# Patient Record
Sex: FEMALE | Race: BLACK OR AFRICAN AMERICAN | NOT HISPANIC OR LATINO | Employment: FULL TIME | URBAN - METROPOLITAN AREA
[De-identification: names, ages, dates, MRNs, and addresses within clinical notes are randomized per-mention and may not be internally consistent; named-entity substitution may affect disease eponyms.]

---

## 2024-06-25 ENCOUNTER — HOSPITAL ENCOUNTER (EMERGENCY)
Facility: HOSPITAL | Age: 44
Discharge: HOME | End: 2024-06-25
Attending: INTERNAL MEDICINE
Payer: MEDICAID

## 2024-06-25 VITALS
WEIGHT: 142.88 LBS | DIASTOLIC BLOOD PRESSURE: 95 MMHG | TEMPERATURE: 98 F | HEIGHT: 68 IN | BODY MASS INDEX: 21.65 KG/M2 | RESPIRATION RATE: 18 BRPM | HEART RATE: 97 BPM | OXYGEN SATURATION: 99 % | SYSTOLIC BLOOD PRESSURE: 153 MMHG

## 2024-06-25 DIAGNOSIS — N93.8 DUB (DYSFUNCTIONAL UTERINE BLEEDING): Primary | ICD-10-CM

## 2024-06-25 LAB
B-HCG UR QL: NEGATIVE
BASOPHILS # BLD AUTO: 0.05 X10(3)/MCL
BASOPHILS NFR BLD AUTO: 0.7 %
CTP QC/QA: YES
EOSINOPHIL # BLD AUTO: 0.14 X10(3)/MCL (ref 0–0.9)
EOSINOPHIL NFR BLD AUTO: 2 %
ERYTHROCYTE [DISTWIDTH] IN BLOOD BY AUTOMATED COUNT: 17.6 % (ref 11.5–17)
HCT VFR BLD AUTO: 30.1 % (ref 37–47)
HGB BLD-MCNC: 10.5 G/DL (ref 12–16)
IMM GRANULOCYTES # BLD AUTO: 0.01 X10(3)/MCL (ref 0–0.04)
IMM GRANULOCYTES NFR BLD AUTO: 0.1 %
LYMPHOCYTES # BLD AUTO: 3.03 X10(3)/MCL (ref 0.6–4.6)
LYMPHOCYTES NFR BLD AUTO: 43.1 %
MCH RBC QN AUTO: 31.3 PG (ref 27–31)
MCHC RBC AUTO-ENTMCNC: 34.9 G/DL (ref 33–36)
MCV RBC AUTO: 89.9 FL (ref 80–94)
MONOCYTES # BLD AUTO: 0.49 X10(3)/MCL (ref 0.1–1.3)
MONOCYTES NFR BLD AUTO: 7 %
NEUTROPHILS # BLD AUTO: 3.31 X10(3)/MCL (ref 2.1–9.2)
NEUTROPHILS NFR BLD AUTO: 47.1 %
NRBC BLD AUTO-RTO: 0 %
PLATELET # BLD AUTO: 363 X10(3)/MCL (ref 130–400)
PMV BLD AUTO: 9.5 FL (ref 7.4–10.4)
RBC # BLD AUTO: 3.35 X10(6)/MCL (ref 4.2–5.4)
WBC # BLD AUTO: 7.03 X10(3)/MCL (ref 4.5–11.5)

## 2024-06-25 PROCEDURE — 85025 COMPLETE CBC W/AUTO DIFF WBC: CPT

## 2024-06-25 PROCEDURE — 96372 THER/PROPH/DIAG INJ SC/IM: CPT

## 2024-06-25 PROCEDURE — 63600175 PHARM REV CODE 636 W HCPCS

## 2024-06-25 PROCEDURE — 99284 EMERGENCY DEPT VISIT MOD MDM: CPT | Mod: 25

## 2024-06-25 PROCEDURE — 81025 URINE PREGNANCY TEST: CPT | Performed by: INTERNAL MEDICINE

## 2024-06-25 RX ORDER — KETOROLAC TROMETHAMINE 30 MG/ML
30 INJECTION, SOLUTION INTRAMUSCULAR; INTRAVENOUS
Status: COMPLETED | OUTPATIENT
Start: 2024-06-25 | End: 2024-06-25

## 2024-06-25 RX ORDER — KETOROLAC TROMETHAMINE 30 MG/ML
INJECTION, SOLUTION INTRAMUSCULAR; INTRAVENOUS
Status: DISCONTINUED
Start: 2024-06-25 | End: 2024-06-26 | Stop reason: HOSPADM

## 2024-06-25 RX ORDER — NAPROXEN 500 MG/1
500 TABLET ORAL 2 TIMES DAILY
Qty: 10 TABLET | Refills: 0 | Status: SHIPPED | OUTPATIENT
Start: 2024-06-25

## 2024-06-25 RX ADMIN — KETOROLAC TROMETHAMINE 30 MG: 30 INJECTION, SOLUTION INTRAMUSCULAR; INTRAVENOUS at 10:06

## 2024-06-26 NOTE — ED PROVIDER NOTES
"Encounter Date: 2024       History     Chief Complaint   Patient presents with    Vaginal Bleeding     Vaginal bleeding x 5 weeks, 3 pads per day. Reports seen by GYN and received depot shot. C/o lower abdominal pain.     43 y.o f present with vaginal bleeding and right lower abdominal pain. In the beginning require 4-5 heavy duty pads per day, changing every 2 hours. Abdominal pain is 8/10, travels along the lower abdomen. Hx of . Gyn is [facility] in [place]. Work up so far includes transvaginal and transabdominal US, results have all been negative. Underwent endometrial "scraping" 3 weeks prior along with depo provera injection.    The history is provided by the patient.     Review of patient's allergies indicates:  No Known Allergies  History reviewed. No pertinent past medical history.  History reviewed. No pertinent surgical history.  No family history on file.  Social History     Tobacco Use    Smoking status: Every Day     Current packs/day: 1.00     Types: Cigarettes    Smokeless tobacco: Never     Review of Systems   Constitutional:  Negative for chills, diaphoresis and fever.   Respiratory:  Negative for shortness of breath.    Cardiovascular:  Negative for chest pain.   Gastrointestinal:  Positive for abdominal pain. Negative for nausea and vomiting.   Genitourinary:  Positive for vaginal bleeding. Negative for dysuria and vaginal discharge.   Neurological:  Negative for dizziness.       Physical Exam     Initial Vitals [24]   BP Pulse Resp Temp SpO2   (!) 142/99 110 14 98.2 °F (36.8 °C) 100 %      MAP       --         Physical Exam    Nursing note and vitals reviewed.  Constitutional: She appears well-developed. No distress.   HENT:   Head: Normocephalic and atraumatic.   Right Ear: External ear normal.   Left Ear: External ear normal.   Nose: Nose normal.   Mouth/Throat: Oropharynx is clear and moist.   Eyes: EOM are normal. Pupils are equal, round, and reactive to light.   Neck: " Neck supple. No thyromegaly present. No JVD present.   Normal range of motion.  Cardiovascular:  Normal rate, regular rhythm, normal heart sounds and intact distal pulses.           No murmur heard.  Pulmonary/Chest: Breath sounds normal. No respiratory distress. She has no wheezes. She has no rales.   Abdominal: Abdomen is soft. She exhibits no distension and no mass. There is abdominal tenderness (right lower quadrant). There is guarding. There is no rebound.   Genitourinary:    Pelvic exam was performed with patient prone.   There is no lesion on the right labia. There is no lesion on the left labia. Uterus is not enlarged. Cervix exhibits no motion tenderness and no discharge.    Vaginal bleeding present.   There is bleeding in the vagina.    No signs of injury in the vagina.      Genitourinary Comments: Able to visualize cervix, oozing blood     Musculoskeletal:         General: No tenderness.      Cervical back: Normal range of motion and neck supple.     Neurological: She is alert and oriented to person, place, and time. She has normal strength. GCS score is 15. GCS eye subscore is 4. GCS verbal subscore is 5. GCS motor subscore is 6.   Skin: Skin is warm and dry. Capillary refill takes less than 2 seconds. No rash noted.   Old healed  scar to right lower abdomen         ED Course   Procedures  Labs Reviewed   CBC WITH DIFFERENTIAL - Abnormal; Notable for the following components:       Result Value    RBC 3.35 (*)     Hgb 10.5 (*)     Hct 30.1 (*)     MCH 31.3 (*)     RDW 17.6 (*)     All other components within normal limits   CBC W/ AUTO DIFFERENTIAL    Narrative:     The following orders were created for panel order CBC auto differential.  Procedure                               Abnormality         Status                     ---------                               -----------         ------                     CBC with Differential[[id number]]       Abnormal            Final result                  Please view results for these tests on the individual orders.   EXTRA TUBES    Narrative:     The following orders were created for panel order EXTRA TUBES.  Procedure                               Abnormality         Status                     ---------                               -----------         ------                     Light Green Top Hold[[id number]]                                                       Gold Top Hold[[id number]]                                                                Please view results for these tests on the individual orders.   LIGHT GREEN TOP HOLD   GOLD TOP HOLD   POCT URINE PREGNANCY          Imaging Results    None          Medications   ketorolac (TORADOL) 30 mg/mL (1 mL) injection (has no administration in time range)   ketorolac injection 30 mg (30 mg Intramuscular Given 24)     Medical Decision Making  43 y.o f present with vaginal bleeding and right lower abdominal pain. Hemodynamically stable, on exam she did not have copious amount of blood obscuring cervix. Given toradol in ED for pain and discharged home with naproxen x 5 days. She expressed understanding and was given strict ED return precautions for fatigue, shortness of breath, heart palpitations, and dizziness.    Amount and/or Complexity of Data Reviewed  Labs: ordered. Decision-making details documented in ED Course.    Risk  Prescription drug management.      Additional MDM:   Differential Diagnosis:   Other: The following diagnoses were also considered and will be evaluated: spontateous , pregnancy and severe anemia.           Attending Attestation:   Physician Attestation Statement for Resident:  As the supervising MD   Physician Attestation Statement: I have personally seen and examined this patient.   I agree with the above history.  -:   As the supervising MD I agree with the above PE.     As the supervising MD I agree with the above treatment, course, plan, and disposition.    I  have reviewed and agree with the residents interpretation of the following: lab data.                 ED Course as of 06/25/24 2229 Tue Jun 25, 2024 2224 Discussed with patient the results and treatment plan. She is hemodynamically stable. Will need to follow up with PCP and Gyn to explore treatment options. Declined referral to [facility] gyn at this time. Toradol injection given in ED and rx for naproen sent to patient pharmacy. She is stable for discharge. [AS]      ED Course User Index  [AS] Velvet De La Rosa DO                           Clinical Impression:  Final diagnoses:  [N93.8] DUB (dysfunctional uterine bleeding) (Primary)          ED Disposition Condition    Discharge Stable          ED Prescriptions       Medication Sig Dispense Start Date End Date Auth. Provider    naproxen (NAPROSYN) 500 MG tablet Take 1 tablet (500 mg total) by mouth 2 (two) times daily. 10 tablet 6/25/2024 -- Velvet De La Rosa DO          Follow-up Information       Follow up With Specialties Details Why Contact Info    Malcolm Hastings MD Family Medicine Schedule an appointment as soon as possible for a visit   [address] [place] [address]  [phone]      Yannick, Asia GLYNN MD Obstetrics and Gynecology Schedule an appointment as soon as possible for a visit   [address] [facility] [address]  [phone]      [facility] Emergency Medicine  If symptoms worsen [address] [facility] [address] [phone]             Velvet De La Rosa DO  Resident  06/25/24 2229       Foster Rosenthal MD  06/25/24 5386

## 2024-08-02 ENCOUNTER — HOSPITAL ENCOUNTER (EMERGENCY)
Facility: HOSPITAL | Age: 44
Discharge: HOME | End: 2024-08-02
Attending: EMERGENCY MEDICINE
Payer: MEDICAID

## 2024-08-02 VITALS
SYSTOLIC BLOOD PRESSURE: 155 MMHG | TEMPERATURE: 97 F | WEIGHT: 143.31 LBS | RESPIRATION RATE: 15 BRPM | DIASTOLIC BLOOD PRESSURE: 79 MMHG | HEART RATE: 85 BPM | BODY MASS INDEX: 21.79 KG/M2 | OXYGEN SATURATION: 100 %

## 2024-08-02 DIAGNOSIS — K43.9 ABDOMINAL WALL HERNIA: Primary | ICD-10-CM

## 2024-08-02 DIAGNOSIS — R52 PAIN: ICD-10-CM

## 2024-08-02 DIAGNOSIS — R10.31 RIGHT LOWER QUADRANT ABDOMINAL PAIN: ICD-10-CM

## 2024-08-02 DIAGNOSIS — G89.29 CHRONIC RIGHT SHOULDER PAIN: ICD-10-CM

## 2024-08-02 DIAGNOSIS — M25.511 CHRONIC RIGHT SHOULDER PAIN: ICD-10-CM

## 2024-08-02 LAB
BACTERIA #/AREA URNS AUTO: ABNORMAL /HPF
BASOPHILS # BLD AUTO: 0.07 X10(3)/MCL
BASOPHILS NFR BLD AUTO: 1.3 %
BILIRUB UR QL STRIP.AUTO: NEGATIVE
CLARITY UR: ABNORMAL
COLOR UR AUTO: ABNORMAL
EOSINOPHIL # BLD AUTO: 0 X10(3)/MCL (ref 0–0.9)
EOSINOPHIL NFR BLD AUTO: 0 %
ERYTHROCYTE [DISTWIDTH] IN BLOOD BY AUTOMATED COUNT: 15.9 % (ref 11.5–17)
GLUCOSE UR QL STRIP: NORMAL
HCT VFR BLD AUTO: 30.5 % (ref 37–47)
HGB BLD-MCNC: 10.3 G/DL (ref 12–16)
HGB UR QL STRIP: NEGATIVE
HOLD SPECIMEN: NORMAL
HYALINE CASTS #/AREA URNS LPF: ABNORMAL /LPF
IMM GRANULOCYTES # BLD AUTO: 0.01 X10(3)/MCL (ref 0–0.04)
IMM GRANULOCYTES NFR BLD AUTO: 0.2 %
KETONES UR QL STRIP: NEGATIVE
LEUKOCYTE ESTERASE UR QL STRIP: 25
LYMPHOCYTES # BLD AUTO: 2.78 X10(3)/MCL (ref 0.6–4.6)
LYMPHOCYTES NFR BLD AUTO: 51.2 %
MCH RBC QN AUTO: 30.1 PG (ref 27–31)
MCHC RBC AUTO-ENTMCNC: 33.8 G/DL (ref 33–36)
MCV RBC AUTO: 89.2 FL (ref 80–94)
MONOCYTES # BLD AUTO: 0.31 X10(3)/MCL (ref 0.1–1.3)
MONOCYTES NFR BLD AUTO: 5.7 %
MUCOUS THREADS URNS QL MICRO: ABNORMAL /LPF
NEUTROPHILS # BLD AUTO: 2.26 X10(3)/MCL (ref 2.1–9.2)
NEUTROPHILS NFR BLD AUTO: 41.6 %
NITRITE UR QL STRIP: NEGATIVE
NRBC BLD AUTO-RTO: 0 %
PH UR STRIP: 6 [PH]
PLATELET # BLD AUTO: 333 X10(3)/MCL (ref 130–400)
PMV BLD AUTO: 9.5 FL (ref 7.4–10.4)
PROT UR QL STRIP: ABNORMAL
RBC # BLD AUTO: 3.42 X10(6)/MCL (ref 4.2–5.4)
RBC #/AREA URNS AUTO: ABNORMAL /HPF
SARS-COV-2 RDRP RESP QL NAA+PROBE: NEGATIVE
SP GR UR STRIP.AUTO: 1.01 (ref 1–1.03)
SQUAMOUS #/AREA URNS LPF: ABNORMAL /HPF
UROBILINOGEN UR STRIP-ACNC: NORMAL
WBC # BLD AUTO: 5.43 X10(3)/MCL (ref 4.5–11.5)
WBC #/AREA URNS AUTO: ABNORMAL /HPF
WBC CLUMPS UR QL AUTO: ABNORMAL

## 2024-08-02 PROCEDURE — 85025 COMPLETE CBC W/AUTO DIFF WBC: CPT

## 2024-08-02 PROCEDURE — 81001 URINALYSIS AUTO W/SCOPE: CPT

## 2024-08-02 PROCEDURE — U0002 COVID-19 LAB TEST NON-CDC: HCPCS

## 2024-08-02 PROCEDURE — 99284 EMERGENCY DEPT VISIT MOD MDM: CPT | Mod: 25

## 2024-08-02 RX ORDER — IBUPROFEN 800 MG/1
800 TABLET ORAL EVERY 8 HOURS PRN
COMMUNITY
Start: 2024-07-29

## 2024-08-02 RX ORDER — MEDROXYPROGESTERONE ACETATE 150 MG/ML
150 INJECTION, SUSPENSION INTRAMUSCULAR
COMMUNITY
Start: 2024-06-05

## 2024-08-02 RX ORDER — FOLIC ACID 1 MG/1
1000 TABLET ORAL DAILY
COMMUNITY
Start: 2024-07-18

## 2024-08-13 ENCOUNTER — OFFICE VISIT (OUTPATIENT)
Dept: SURGERY | Facility: CLINIC | Age: 44
End: 2024-08-13
Payer: MEDICAID

## 2024-08-13 VITALS
HEIGHT: 68 IN | TEMPERATURE: 98 F | RESPIRATION RATE: 20 BRPM | SYSTOLIC BLOOD PRESSURE: 150 MMHG | WEIGHT: 148 LBS | HEART RATE: 96 BPM | BODY MASS INDEX: 22.43 KG/M2 | OXYGEN SATURATION: 100 % | DIASTOLIC BLOOD PRESSURE: 102 MMHG

## 2024-08-13 DIAGNOSIS — K43.9 ABDOMINAL WALL HERNIA: ICD-10-CM

## 2024-08-13 PROCEDURE — 99215 OFFICE O/P EST HI 40 MIN: CPT | Mod: PBBFAC

## 2024-08-13 RX ORDER — TRAMADOL HYDROCHLORIDE 50 MG/1
50 TABLET ORAL EVERY 12 HOURS PRN
Qty: 14 TABLET | Refills: 0 | Status: SHIPPED | OUTPATIENT
Start: 2024-08-13 | End: 2024-08-20

## 2024-08-13 NOTE — PROGRESS NOTES
[facility]   Clinic Note       CC: abdominal hernia       HPI: Rosa Treviño is a 43 y.o. female with PMHx HTN and PSHx of 3 C sections, most recent 18 years ago, presents for evaluation of right lower abdominal pain. She has had this hard mass right lower abdomen since her last C section, but it did not cause her any problems until 2 years ago. She states the pain is worst at the beginnings of her menstrual cycle. She has previously been seen in the ED for this pain. Patient denies any nausea, vomiting, constipation, or diarrhea. Patient also denies any chest pains or SOB.     PMH:  History reviewed. No pertinent past medical history.      PSH:  Caesarean section x3, most recent 18 years ago  Diagnostic laparoscopy 7 years ago     Medications:  Current Outpatient Medications on File Prior to Visit   Medication Sig Dispense Refill    folic acid (FOLVITE) 1 MG tablet Take 1,000 mcg by mouth once daily.       mg tablet Take 800 mg by mouth every 8 (eight) hours as needed.      medroxyPROGESTERone (DEPO-PROVERA) 150 mg/mL Syrg Inject 150 mg into the muscle every 3 (three) months.      naproxen (NAPROSYN) 500 MG tablet Take 1 tablet (500 mg total) by mouth 2 (two) times daily. 10 tablet 0     No current facility-administered medications on file prior to visit.        Allergies:  Review of patient's allergies indicates:  No Known Allergies      Social Hx:  Social History     Tobacco Use   Smoking Status Every Day    Current packs/day: 1.00    Types: Cigarettes   Smokeless Tobacco Never      Social History     Substance and Sexual Activity   Alcohol Use None         Relevant Family Hx:  No family history on file.       Physical Exam:   Vitals:    08/13/24 1340   BP: (!) 157/101   Pulse: 96   Resp: 20   Temp: 97.9 °F (36.6 °C)      Gen: NAD, AAOx3   Eye: EOMI   CV: RR  Pulm: NWOB on RA  Abd: RLQ tenderness to palpation, hard nodule felt that doesn't increase in size with valsalva. No hernia felt on exam  or with valsalva  Ext:  Move all 4 extremities.           A/P: Rosa Treviño is a 43 y.o. female with PSHx of C section x3, diagnostic laparoscopy presents for evaluation of right lower abdominal pain that has worsened over the last 2 years. Patient states that she first felt this mass after her last , but the pain has worsened over the last 2 years. A small, hard, tender non-mobile mass that does not increase on valsalva in RLQ of abdomen on exam.        - RLQ abdominal ultrasound  - Tramadol prescribed for abdominal pain  - Follow up in 3 weeks after US    Marsha Carrington  MS3     Patient seen and examined alongside medical student. Note reviewed and edited as appropriate. Agree with plan as written above.     J Carlos Thurston MD  [employer] General Surgery PGY-1

## 2024-08-13 NOTE — PROGRESS NOTES
[facility]   Clinic Note       CC: abdominal hernia       HPI: Rosa Treviño is a 43 y.o. female with PMHx HTN and PSHx of 3 C sections, most recent 18 years ago, presents for evaluation of right lower abdominal pain. She has had this hard mass right lower abdomen since her last C section, but it did not cause her any problems until 2 years ago. She states the pain is worst at the beginnings of her menstrual cycle. She has previously been seen in the ED for this pain. Patient denies any nausea, vomiting, constipation, or diarrhea. Patient also denies any chest pains or SOB.     PMH:  History reviewed. No pertinent past medical history.      PSH:  Caesarean section x3, most recent 18 years ago  Diagnostic laparoscopy 7 years ago     Medications:  Current Outpatient Medications on File Prior to Visit   Medication Sig Dispense Refill    folic acid (FOLVITE) 1 MG tablet Take 1,000 mcg by mouth once daily.       mg tablet Take 800 mg by mouth every 8 (eight) hours as needed.      medroxyPROGESTERone (DEPO-PROVERA) 150 mg/mL Syrg Inject 150 mg into the muscle every 3 (three) months.      naproxen (NAPROSYN) 500 MG tablet Take 1 tablet (500 mg total) by mouth 2 (two) times daily. 10 tablet 0     No current facility-administered medications on file prior to visit.        Allergies:  Review of patient's allergies indicates:  No Known Allergies      Social Hx:  Social History     Tobacco Use   Smoking Status Every Day    Current packs/day: 1.00    Types: Cigarettes   Smokeless Tobacco Never      Social History     Substance and Sexual Activity   Alcohol Use None         Relevant Family Hx:  No family history on file.       Physical Exam:   Vitals:    08/13/24 1426   BP: (!) 150/102   Pulse:    Resp:    Temp:       Gen: NAD, AAOx3   Eye: EOMI   CV: RR  Pulm: NWOB on RA  Abd: RLQ tenderness to palpation, hard nodule felt that doesn't increase in size with valsalva. No hernia felt on exam or with valsalva  Ext:   Move all 4 extremities.           A/P: Rosa Treviño is a 43 y.o. female with PSHx of C section x3, diagnostic laparoscopy presents for evaluation of right lower abdominal pain that has worsened over the last 2 years. Patient states that she first felt this mass after her last , but the pain has worsened over the last 2 years. A small, hard, tender non-mobile mass that does not increase on valsalva in RLQ of abdomen on exam.        - RLQ abdominal ultrasound  - Tramadol prescribed for abdominal pain  - Follow up in 3 weeks after US    Marsha Carrington  MS3     Patient seen and examined alongside medical student. Note reviewed and edited as appropriate. Agree with plan as written above.     J Carlos Thurston MD  [employer] General Surgery PGY-1

## 2024-08-14 NOTE — PROGRESS NOTES
I have reviewed the notes, assessments, and/or procedures performed by Karena, I concur with her/his documentation of Rosa Treviño.  Date of Service: 8/13/2024    [facility]

## 2024-08-27 ENCOUNTER — HOSPITAL ENCOUNTER (OUTPATIENT)
Dept: RADIOLOGY | Facility: HOSPITAL | Age: 44
Discharge: HOME | End: 2024-08-27
Payer: MEDICAID

## 2024-08-27 DIAGNOSIS — K43.9 ABDOMINAL WALL HERNIA: ICD-10-CM

## 2024-08-27 PROCEDURE — 76705 ECHO EXAM OF ABDOMEN: CPT | Mod: TC

## 2024-09-03 ENCOUNTER — OFFICE VISIT (OUTPATIENT)
Dept: SURGERY | Facility: CLINIC | Age: 44
End: 2024-09-03
Payer: MEDICAID

## 2024-09-03 VITALS
WEIGHT: 149 LBS | TEMPERATURE: 98 F | RESPIRATION RATE: 18 BRPM | DIASTOLIC BLOOD PRESSURE: 112 MMHG | HEIGHT: 68 IN | SYSTOLIC BLOOD PRESSURE: 168 MMHG | OXYGEN SATURATION: 100 % | HEART RATE: 105 BPM | BODY MASS INDEX: 22.58 KG/M2

## 2024-09-03 DIAGNOSIS — K43.9 ABDOMINAL WALL HERNIA: Primary | ICD-10-CM

## 2024-09-03 PROCEDURE — 99214 OFFICE O/P EST MOD 30 MIN: CPT | Mod: PBBFAC

## 2024-09-03 RX ORDER — AMITRIPTYLINE HYDROCHLORIDE 25 MG/1
25 TABLET, FILM COATED ORAL NIGHTLY PRN
COMMUNITY

## 2024-09-03 RX ORDER — FERROUS SULFATE 325(65) MG
325 TABLET, DELAYED RELEASE (ENTERIC COATED) ORAL
COMMUNITY

## 2024-09-03 NOTE — PROGRESS NOTES
[facility]   Clinic Note       CC: 3 week f/u after RLQ abdominal U/S       HPI: Rosa Treviño is a 43 y.o. female with PMHx of HTN and PSHx of C section x3, diagnostic laparoscopy presents to clinic following RLQ abdominal U/S. Pt states her pain is unchanged since last visit (8/13). She still rates it at 8/10 and describes it as a stabbing pain. She reports that it is worst at the beginning of her menstrual cycle. She denies nausea, vomiting, diarrhea, fever, chills, CP, or SOB. She reports regular Bowel Movements and voiding of bladder.     PMH:  History reviewed. No pertinent past medical history.      PSH:  History reviewed. No pertinent surgical history.      Medications:  Current Outpatient Medications on File Prior to Visit   Medication Sig Dispense Refill    amitriptyline (ELAVIL) 25 MG tablet Take 25 mg by mouth nightly as needed for Insomnia.      ferrous sulfate 325 (65 FE) MG EC tablet Take 325 mg by mouth 3 (three) times daily with meals.      folic acid (FOLVITE) 1 MG tablet Take 1,000 mcg by mouth once daily.       mg tablet Take 800 mg by mouth every 8 (eight) hours as needed.      medroxyPROGESTERone (DEPO-PROVERA) 150 mg/mL Syrg Inject 150 mg into the muscle every 3 (three) months.      naproxen (NAPROSYN) 500 MG tablet Take 1 tablet (500 mg total) by mouth 2 (two) times daily. (Patient not taking: Reported on 9/3/2024) 10 tablet 0     No current facility-administered medications on file prior to visit.        Allergies:  Review of patient's allergies indicates:  No Known Allergies      Social Hx:  Social History     Tobacco Use   Smoking Status Every Day    Current packs/day: 1.00    Types: Cigarettes   Smokeless Tobacco Never      Social History     Substance and Sexual Activity   Alcohol Use None         Relevant Family Hx:  No family history on file.       Physical Exam:   Vitals:    09/03/24 1042   BP: (!) 168/112   Pulse:    Resp:    Temp:       Gen: NAD, AAOx3   Eye: EOMI    CV: RR  Pulm: NWOB on RA  Abd: RLQ tenderness to palpation, hard nodule felt that doesn't increase in size with valsalva. No hernia felt on exam or with valsalva   Ext:  Move all 4 extremities.         Interval Imaging:    Narrative & Impression  EXAMINATION:  US ABDOMEN LIMITED     CLINICAL HISTORY:  Right lower abdominal pain and hard mass; Ventral hernia without obstruction or gangrene     COMPARISON:  None     FINDINGS:  Transverse and longitudinal sonographic images were obtained of the abdominal wall area of interest near prior  scar.  Shadowing hypoechoic area at the level of the scar measures 2.9 x 1.8 x 2.6 cm.  Minimal internal Doppler flow is seen.     Impression:     Subcutaneous hypoechoic mass in the area of interest may represent keloid or other irregular scarring, endometrioma or other mass lesion.        Electronically signed by:Romel Joshua MD  Date:                                            2024  Time:                                           12:41         A/P: Rosa Treviño is a 43 y.o. female with PMHx of  HTN and PSHx of C section x3, diagnostic laparoscopy presents to clinic following RLQ abdominal U/S. Since pain is related to her cycle, and U/S is inconclusive to determine if the mass is a keloid vs. Endometrioma vs. Other mass lesion.       - Patient has appointment with OB-GYN tomorrow, recommend attending follow up appointment do to ongoing symptoms and possibility of endometrioma   - Will follow up with patient as needed.     Brandon Mclaughlin MS3     I have reviewed the not above and edited where required. I agree with assessment and plan.   Srinivasan Cantu MD  [employer] Surgery PGY3

## 2024-09-03 NOTE — PROGRESS NOTES
I have reviewed the notes, assessments, and/or procedures performed by Ani, I concur with her/his documentation of Rosa Treviño.  Date of Service: 9/3/2024    [facility]

## 2024-09-03 NOTE — PROGRESS NOTES
[facility]   Clinic Note       CC: 3 week f/u after RLQ abdominal U/S       HPI: Rosa Terviño is a 43 y.o. female with PMHx of HTN and PSHx of C section x3, diagnostic laparoscopy presents to clinic following RLQ abdominal U/S. Pt states her pain is unchanged since last visit (8/13). She still rates it at 8/10 and describes it as a stabbing pain. She reports that it is worst at the beginning of her menstrual cycle. She denies nausea, vomiting, diarrhea, fever, chills, CP, or SOB. She reports regular Bowel Movements and voiding of bladder.     PMH:  No past medical history on file.      PSH:  No past surgical history on file.      Medications:  Current Outpatient Medications on File Prior to Visit   Medication Sig Dispense Refill    folic acid (FOLVITE) 1 MG tablet Take 1,000 mcg by mouth once daily.       mg tablet Take 800 mg by mouth every 8 (eight) hours as needed.      medroxyPROGESTERone (DEPO-PROVERA) 150 mg/mL Syrg Inject 150 mg into the muscle every 3 (three) months.      naproxen (NAPROSYN) 500 MG tablet Take 1 tablet (500 mg total) by mouth 2 (two) times daily. 10 tablet 0     No current facility-administered medications on file prior to visit.        Allergies:  Review of patient's allergies indicates:  No Known Allergies      Social Hx:  Social History     Tobacco Use   Smoking Status Every Day    Current packs/day: 1.00    Types: Cigarettes   Smokeless Tobacco Never      Social History     Substance and Sexual Activity   Alcohol Use None         Relevant Family Hx:  No family history on file.       Physical Exam:   There were no vitals filed for this visit.   Gen: NAD, AAOx3   Eye: EOMI   CV: RR  Pulm: NWOB on RA  Abd: RLQ tenderness to palpation, hard nodule felt that doesn't increase in size with valsalva. No hernia felt on exam or with valsalva   Ext:  Move all 4 extremities.         Interval Imaging:    Narrative & Impression  EXAMINATION:  US ABDOMEN LIMITED     CLINICAL  HISTORY:  Right lower abdominal pain and hard mass; Ventral hernia without obstruction or gangrene     COMPARISON:  None     FINDINGS:  Transverse and longitudinal sonographic images were obtained of the abdominal wall area of interest near prior  scar.  Shadowing hypoechoic area at the level of the scar measures 2.9 x 1.8 x 2.6 cm.  Minimal internal Doppler flow is seen.     Impression:     Subcutaneous hypoechoic mass in the area of interest may represent keloid or other irregular scarring, endometrioma or other mass lesion.        Electronically signed by:Romel Joshua MD  Date:                                            2024  Time:                                           12:41         A/P: Rosa Treviño is a 43 y.o. female with PMHx of  HTN and PSHx of C section x3, diagnostic laparoscopy presents to clinic following RLQ abdominal U/S. Since pain is related to her cycle, and U/S is inconclusive to determine if the mass is a keloid vs. Endometrioma vs. Other mass lesion, pt should be referred to OB/GYN       - Refer to OB/GYN    Brandon Mclaughlin MS3

## 2024-09-03 NOTE — PROGRESS NOTES
Patient seen in gen surg clinic by Dr Carlson.  Will follow up with private gyn.  Appointment scheduled for tomorrow.  Follow up gen surg PRN.

## 2024-11-05 ENCOUNTER — OFFICE VISIT (OUTPATIENT)
Dept: ORTHOPEDICS | Facility: CLINIC | Age: 44
End: 2024-11-05
Payer: MEDICAID

## 2024-11-05 VITALS
HEIGHT: 69 IN | HEART RATE: 80 BPM | DIASTOLIC BLOOD PRESSURE: 96 MMHG | TEMPERATURE: 99 F | BODY MASS INDEX: 22.27 KG/M2 | SYSTOLIC BLOOD PRESSURE: 149 MMHG | WEIGHT: 150.38 LBS

## 2024-11-05 DIAGNOSIS — M75.101 ROTATOR CUFF SYNDROME, RIGHT: Primary | ICD-10-CM

## 2024-11-05 PROCEDURE — 3008F BODY MASS INDEX DOCD: CPT | Mod: CPTII,,, | Performed by: NURSE PRACTITIONER

## 2024-11-05 PROCEDURE — 99203 OFFICE O/P NEW LOW 30 MIN: CPT | Mod: S$PBB,,, | Performed by: NURSE PRACTITIONER

## 2024-11-05 PROCEDURE — 1160F RVW MEDS BY RX/DR IN RCRD: CPT | Mod: CPTII,,, | Performed by: NURSE PRACTITIONER

## 2024-11-05 PROCEDURE — 99214 OFFICE O/P EST MOD 30 MIN: CPT | Mod: PBBFAC | Performed by: NURSE PRACTITIONER

## 2024-11-05 PROCEDURE — 1159F MED LIST DOCD IN RCRD: CPT | Mod: CPTII,,, | Performed by: NURSE PRACTITIONER

## 2024-11-05 PROCEDURE — 3077F SYST BP >= 140 MM HG: CPT | Mod: CPTII,,, | Performed by: NURSE PRACTITIONER

## 2024-11-05 PROCEDURE — 3080F DIAST BP >= 90 MM HG: CPT | Mod: CPTII,,, | Performed by: NURSE PRACTITIONER
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
## 2024-11-19 ENCOUNTER — PATIENT MESSAGE (OUTPATIENT)
Dept: RESEARCH | Facility: HOSPITAL | Age: 44
End: 2024-11-19
Payer: MEDICAID

## 2024-11-27 ENCOUNTER — PATIENT MESSAGE (OUTPATIENT)
Dept: RESEARCH | Facility: HOSPITAL | Age: 44
End: 2024-11-27
Payer: MEDICAID